# Patient Record
Sex: MALE | URBAN - METROPOLITAN AREA
[De-identification: names, ages, dates, MRNs, and addresses within clinical notes are randomized per-mention and may not be internally consistent; named-entity substitution may affect disease eponyms.]

---

## 2019-04-14 ENCOUNTER — EMERGENCY (EMERGENCY)
Facility: HOSPITAL | Age: 64
LOS: 1 days | Discharge: HOME | End: 2019-04-14
Attending: EMERGENCY MEDICINE
Payer: MEDICAID

## 2019-04-14 VITALS
WEIGHT: 147.05 LBS | RESPIRATION RATE: 16 BRPM | OXYGEN SATURATION: 98 % | HEART RATE: 87 BPM | DIASTOLIC BLOOD PRESSURE: 88 MMHG | SYSTOLIC BLOOD PRESSURE: 139 MMHG | TEMPERATURE: 98 F | HEIGHT: 65 IN

## 2019-04-14 VITALS
SYSTOLIC BLOOD PRESSURE: 118 MMHG | RESPIRATION RATE: 16 BRPM | DIASTOLIC BLOOD PRESSURE: 75 MMHG | HEART RATE: 64 BPM | TEMPERATURE: 98 F | OXYGEN SATURATION: 99 %

## 2019-04-14 LAB

## 2019-04-14 PROCEDURE — 99284 EMERGENCY DEPT VISIT MOD MDM: CPT

## 2019-04-14 PROCEDURE — 81001 URINALYSIS AUTO W/SCOPE: CPT

## 2019-04-14 RX ORDER — FAMOTIDINE 10 MG/ML
20 INJECTION INTRAVENOUS ONCE
Qty: 0 | Refills: 0 | Status: COMPLETED | OUTPATIENT
Start: 2019-04-14 | End: 2019-04-14

## 2019-04-14 RX ORDER — DIPHENHYDRAMINE HCL 50 MG
25 CAPSULE ORAL ONCE
Qty: 0 | Refills: 0 | Status: COMPLETED | OUTPATIENT
Start: 2019-04-14 | End: 2019-04-14

## 2019-04-14 RX ORDER — HYDROCORTISONE 1 %
1 OINTMENT (GRAM) TOPICAL
Qty: 1 | Refills: 0 | OUTPATIENT
Start: 2019-04-14

## 2019-04-14 RX ORDER — CEPHALEXIN 500 MG
1 CAPSULE ORAL
Qty: 20 | Refills: 0 | OUTPATIENT
Start: 2019-04-14 | End: 2019-04-23

## 2019-04-14 RX ADMIN — FAMOTIDINE 20 MILLIGRAM(S): 10 INJECTION INTRAVENOUS at 01:41

## 2019-04-14 RX ADMIN — Medication 25 MILLIGRAM(S): at 01:41

## 2019-04-14 RX ADMIN — Medication 5 MILLIGRAM(S): at 01:41

## 2019-04-14 NOTE — ED ADULT NURSE NOTE - CHPI ED NUR SYMPTOMS NEG
no shortness of breath/no throat itching/no difficulty breathing/no difficulty swallowing/no swelling of face, tongue

## 2019-04-14 NOTE — ED PROVIDER NOTE - CHPI ED SYMPTOMS NEG
no cough/no throat itching/no shortness of breath/no vomiting/no swelling of face, tongue/no wheezing/no sob/no difficulty swallowing/no nausea/no difficulty breathing

## 2019-04-14 NOTE — ED PROVIDER NOTE - CLINICAL SUMMARY MEDICAL DECISION MAKING FREE TEXT BOX
pt with diffuse rash after starting cipro   will dc cipro and start keflex for uti and give meds here for rash

## 2019-04-14 NOTE — ED PROVIDER NOTE - OBJECTIVE STATEMENT
rash rash on ext, abd, trunk, flank after starting cipro for a UTI two days ago  no throat closing no sob no difficulty swallowing

## 2019-04-14 NOTE — ED ADULT NURSE REASSESSMENT NOTE - NS ED NURSE REASSESS COMMENT FT1
Pt has decreased redness. Pt verbalizes improvement, denies any acute complains, breathing in room air, speaking in full sentences. pt not in any distress.

## 2019-04-14 NOTE — ED ADULT TRIAGE NOTE - CHIEF COMPLAINT QUOTE
c/o allergic reaction since 7 pm ,took cipro at 2 pm as prescribed some rash to both wrist and side of abdomen,denies sob

## 2019-04-14 NOTE — ED ADULT NURSE NOTE - OBJECTIVE STATEMENT
Pt [place] speaking,  ID [id number] used. Pt here for allergic reaction which started when he started taking ciprofloxacin for renal infection. Per pt he took the medications 3 times yesterday and started having the rashes and hives since 7pm. Hives noted on pt's right and left axilla, redness on the back and feet. Pt denies any chest pain, SOB, back pain or difficulty swallowing. Pt Telugu speaking,  ID [id number] used. Pt here for allergic reaction which started when he started taking ciprofloxacin for renal infection. Per pt he took the medications 3 times yesterday and started having the rashes since 7pm. Rashes and redness  noted on pt's right and left axilla, redness on the back and feet. Pt denies any chest pain, SOB, back pain or difficulty swallowing.